# Patient Record
Sex: FEMALE | Race: WHITE | Employment: UNEMPLOYED | URBAN - METROPOLITAN AREA
[De-identification: names, ages, dates, MRNs, and addresses within clinical notes are randomized per-mention and may not be internally consistent; named-entity substitution may affect disease eponyms.]

---

## 2018-06-19 ENCOUNTER — HOSPITAL ENCOUNTER (EMERGENCY)
Age: 58
Discharge: HOME | End: 2018-06-19
Attending: EMERGENCY MEDICINE
Payer: OTHER GOVERNMENT

## 2018-06-19 VITALS
DIASTOLIC BLOOD PRESSURE: 100 MMHG | TEMPERATURE: 97.6 F | OXYGEN SATURATION: 97 % | WEIGHT: 173 LBS | BODY MASS INDEX: 32.69 KG/M2 | RESPIRATION RATE: 16 BRPM | SYSTOLIC BLOOD PRESSURE: 153 MMHG | HEART RATE: 94 BPM
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
## 2018-06-19 DIAGNOSIS — R03.0 ELEVATED BLOOD PRESSURE READING: ICD-10-CM

## 2018-06-19 DIAGNOSIS — R21 RASH: Primary | ICD-10-CM

## 2018-06-19 PROCEDURE — 99282 EMERGENCY DEPT VISIT SF MDM: CPT

## 2018-06-19 RX ORDER — TELMISARTAN 40 MG/1
40 TABLET ORAL DAILY
COMMUNITY

## 2018-06-19 RX ORDER — BISMUTH SUBSALICYLATE 262 MG
1 TABLET,CHEWABLE ORAL DAILY
COMMUNITY

## 2018-06-19 RX ORDER — CETIRIZINE HCL 10 MG
10 TABLET ORAL DAILY
Qty: 20 TAB | Refills: 0 | Status: SHIPPED | OUTPATIENT
Start: 2018-06-19 | End: 2018-06-19

## 2018-06-19 RX ORDER — CETIRIZINE HCL 10 MG
10 TABLET ORAL DAILY
Qty: 20 TAB | Refills: 0 | Status: SHIPPED | OUTPATIENT
Start: 2018-06-19

## 2018-06-19 RX ORDER — HYDROCORTISONE 0.5 %
OINTMENT (GRAM) TOPICAL 2 TIMES DAILY
Qty: 15 G | Refills: 0 | Status: SHIPPED | OUTPATIENT
Start: 2018-06-19 | End: 2018-06-29

## 2018-06-19 NOTE — ED TRIAGE NOTES
C/o rash to entire body arms, trunk and legs since yesterday morning. Denies new lotions, products, detergents. Thinks its related to shingles shot she received 6/4/18. Pt also received tetanus shot same day. States she stayed [place] cabin this weekend but no one else has a reaction.

## 2018-06-19 NOTE — ED NOTES
4:24 PM  I have evaluated the patient as the Provider in Triage. I have reviewed Her vital signs and the triage nurse assessment. I have talked with the patient and any available family and advised that I am the provider in triage and have ordered the appropriate study to initiate their work up based on the clinical presentation during my assessment. I have advised that the patient will be accommodated in the Main ED as soon as possible. I have also requested to contact the triage nurse or myself immediately if the patient experiences any changes in their condition during this brief waiting period. PHILIP Berger    Pt notes rash all over her body starting yesterday. . She thinks it is related to getting the shingles shot on the fourth of this month.  Pt was camping this weekend and may have had a bed bug exposure at the [address] [facility]

## 2018-06-19 NOTE — ED PROVIDER NOTES
HPI Comments: Eleanor Pepe is a 62 y.o. female who presents ambulatory to the ED with a c/o having a rash to her right back, left face, left hand and right foot. She notes the sx have been present x 2 days. Pt is concerned that she got the shingles vaccine 6/4/18 and she thinks she got the shingles rash from the vaccine. She notes the rash is itchy. She also got her tdap updated on the same day. Pt reports no other sx: cp, f/c, sob, abd pain, n/v/d, or urinary sx. She denies tx pta. Pt admits she went camping this weekend and had been outside. She also [facility]. Pt denies tick bites    PCP: Wnada Finch,   PMHx significant for: Past Medical History:  No date: Anxiety  No date: Arthritis      Comment: lower back  12/10/2010: Gallstones  No date: Hypertension  PSHx significant for: Past Surgical History:  2009: HX GYN      Comment: uterine blood vessel ablation  Oct 19, 2010: HX GYN      Comment: D&C  No date: HX WISDOM TEETH EXTRACTION  Social Hx: Tobacco: denies  EtOH: .denies  Illicit drug use: denies     There are no further complaints or symptoms at this time. The history is provided by the patient. Past Medical History:   Diagnosis Date    Anxiety     Arthritis     lower back    Gallstones 12/10/2010    Hypertension        Past Surgical History:   Procedure Laterality Date    HX GYN  2009    uterine blood vessel ablation    HX GYN  Oct 19, 2010    D&C    HX WISDOM TEETH EXTRACTION           Family History:   Problem Relation Age of Onset    Hypertension Mother     Elevated Lipids Mother     Stroke Mother     Heart Disease Mother      CAD,stent    Diabetes Father     Diabetes Sister     Cancer Maternal Grandmother      liver ca       Social History     Social History    Marital status:      Spouse name: N/A    Number of children: N/A    Years of education: N/A     Occupational History    Not on file. Social History Main Topics    Smoking status: Never Smoker    Smokeless tobacco: Never Used    Alcohol use Yes      Comment: occas    Drug use: No    Sexual activity: Yes     Partners: Male      Comment: ,3 children,part time job     Other Topics Concern    Not on file     Social History Narrative         ALLERGIES: Lexapro [escitalopram]    Review of Systems   Constitutional: Negative for chills and fever. HENT: Negative for congestion, rhinorrhea, sneezing and sore throat. Eyes: Negative for redness and visual disturbance. Respiratory: Negative for shortness of breath. Cardiovascular: Negative for chest pain and leg swelling. Gastrointestinal: Negative for abdominal pain, nausea and vomiting. Genitourinary: Negative for difficulty urinating and frequency. Musculoskeletal: Negative for back pain, myalgias and neck stiffness. Skin: Positive for rash. Neurological: Negative for dizziness, syncope, weakness and headaches. Hematological: Negative for adenopathy. Vitals:    06/19/18 1628   BP: (!) 153/100   Pulse: 94   Resp: 16   Temp: 97.6 °F (36.4 °C)   SpO2: 97%   Weight: 78.5 kg (173 lb)            Physical Exam   Constitutional: She is oriented to person, place, and time. She appears well-developed and well-nourished. No distress. HENT:   Head: Normocephalic and atraumatic. Right Ear: External ear normal.   Left Ear: External ear normal.   Eyes: EOM are normal. Pupils are equal, round, and reactive to light. Neck: Neck supple. Cardiovascular: Normal rate, regular rhythm, normal heart sounds and intact distal pulses. Exam reveals no gallop and no friction rub. No murmur heard. Pulmonary/Chest: Effort normal and breath sounds normal. No stridor. No respiratory distress. She has no wheezes. She has no rales. She exhibits no tenderness. Abdominal: Soft. Bowel sounds are normal. She exhibits no distension and no mass. There is no tenderness.  There is no rebound and no guarding. Musculoskeletal: Normal range of motion. She exhibits no edema, tenderness or deformity. Neurological: She is alert and oriented to person, place, and time. No cranial nerve deficit. Coordination normal.   Skin: Rash noted. No erythema. No pallor. Maculopapular blanching eruption noted to right back, left hand, right foot, and left face. + abraded in the center with excoriations. Psychiatric: She has a normal mood and affect. Her behavior is normal.   Nursing note and vitals reviewed. MDM  Number of Diagnoses or Management Options  Elevated blood pressure reading:   Insect bite, initial encounter:   Rash:      Amount and/or Complexity of Data Reviewed  Review and summarize past medical records: yes  Independent visualization of images, tracings, or specimens: yes    Patient Progress  Patient progress: stable        ED Course       Procedures    4:36 PM  Discussed pt, sx, hx and current findings with Aden Baca MD. She is in agreement with plan. Will give topical steroids and antihistamine for insect bite  Alma Silva. NOMAN Hooper      LABORATORY TESTS:  No results found for this or any previous visit (from the past 12 hour(s)). IMAGING RESULTS:    No results found. MEDICATIONS GIVEN:  Medications - No data to display    IMPRESSION:  1. Rash    2. Insect bite, initial encounter    3. Elevated blood pressure reading        PLAN:  1. Discharge Medication List as of 6/19/2018  4:45 PM      START taking these medications    Details   hydrocortisone (CORTIZONE) 0.5 % ointment Apply  to affected area two (2) times a day for 10 days. Apply to affected area twice daily for 10 days. , Print, Disp-15 g, R-0      cetirizine (ZYRTEC) 10 mg tablet Take 1 Tab by mouth daily. , Print, Disp-20 Tab, R-0         CONTINUE these medications which have NOT CHANGED    Details   telmisartan (MICARDIS) 40 mg tablet Take 40 mg by mouth daily. , Historical Med      fish oil-omega-3 fatty acids 340-1,000 mg capsule Take 1 Cap by mouth daily. , Historical Med      multivitamin (ONE A DAY) tablet Take 1 Tab by mouth daily. , Historical Med      LORazepam (ATIVAN) 0.5 mg tablet Take 1 tablet by mouth two (2) times daily as needed for Anxiety. , Print, Disp-60 tablet, R-2           2. Follow-up Information     Follow up With Details Comments [facility], [place]  2-4 days for recheck [address]  [phone]          Return to ED if worse       4:36 PM  Pt has been reexamined. Pt has no new complaints, changes or physical findings. Care plan outlined and precautions discussed. All available results were reviewed with pt. All medications were reviewed with pt. All of pt's questions and concerns were addressed. Pt agrees to F/U as instructed and agrees to return to ED upon further deterioration. Pt is ready to go home.   PHILIP Gresham

## 2018-06-19 NOTE — DISCHARGE INSTRUCTIONS
Elevated Blood Pressure: Care Instructions  Your Care Instructions    Blood pressure is a measure of how hard the blood pushes against the walls of your arteries. It's normal for blood pressure to go up and down throughout the day. But if it stays up over time, you have high blood pressure. Two numbers tell you your blood pressure. The first number is the systolic pressure. It shows how hard the blood pushes when your heart is pumping. The second number is the diastolic pressure. It shows how hard the blood pushes between heartbeats, when your heart is relaxed and filling with blood. An ideal blood pressure in adults is less than 120/80 (say \"120 over 80\"). High blood pressure is 140/90 or higher. You have high blood pressure if your top number is 140 or higher or your bottom number is 90 or higher, or both. The main test for high blood pressure is simple, fast, and painless. To diagnose high blood pressure, your doctor will test your blood pressure at different times. After testing your blood pressure, your doctor may ask you to test it again when you are home. If you are diagnosed with high blood pressure, you can work with your doctor to make a long-term plan to manage it. Follow-up care is a key part of your treatment and safety. Be sure to make and go to all appointments, and call your doctor if you are having problems. It's also a good idea to know your test results and keep a list of the medicines you take. How can you care for yourself at home? · Do not smoke. Smoking increases your risk for heart attack and stroke. If you need help quitting, talk to your doctor about stop-smoking programs and medicines. These can increase your chances of quitting for good. · Stay at a healthy weight. · Try to limit how much sodium you eat to less than 2,300 milligrams (mg) a day. Your doctor may ask you to try to eat less than 1,500 mg a day. · Be physically active.  Get at least 30 minutes of exercise on most days of the week. Walking is a good choice. You also may want to do other activities, such as running, swimming, cycling, or playing tennis or team sports. · Avoid or limit alcohol. Talk to your doctor about whether you can drink any alcohol. · Eat plenty of fruits, vegetables, and low-fat dairy products. Eat less saturated and total fats. · Learn how to check your blood pressure at home. When should you call for help? Call your doctor now or seek immediate medical care if:  ? · Your blood pressure is much higher than normal (such as 180/110 or higher). ? · You think high blood pressure is causing symptoms such as:  ¨ Severe headache. ¨ Blurry vision. ? Watch closely for changes in your health, and be sure to contact your doctor if:  ? · You do not get better as expected. Where can you learn more? Go to http://jesseMyPrintCloudliberty.info/. Enter X330 in the search box to learn more about \"Elevated Blood Pressure: Care Instructions. \"  Current as of: September 21, 2016  Content Version: 11.4  © 7463-4938 [employer]. Care instructions adapted under license by [employer] (which disclaims liability or warranty for this information). If you have questions about a medical condition or this instruction, always ask your healthcare professional. [address] any warranty or liability for your use of this information. Rash: Care Instructions  Your Care Instructions  A rash is any irritation or inflammation of the skin. Rashes have many possible causes, including allergy, infection, illness, heat, and emotional stress. Follow-up care is a key part of your treatment and safety. Be sure to make and go to all appointments, and call your doctor if you are having problems. It's also a good idea to know your test results and keep a list of the medicines you take. How can you care for yourself at home? · Wash the area with water only.  Soap can make dryness and itching worse. Pat dry. · Put cold, wet cloths on the rash to reduce itching. · Keep cool, and stay out of the sun. · Leave the rash open to the air as much of the time as possible. · Sometimes petroleum jelly (Vaseline) can help relieve the discomfort caused by a rash. A moisturizing lotion, such as Cetaphil, also may help. Calamine lotion may help for rashes caused by contact with something (such as a plant or soap) that irritated the skin. Use it 3 or 4 times a day. · If your doctor prescribed a cream, use it as directed. If your doctor prescribed medicine, take it exactly as directed. · If your rash itches so badly that it interferes with your normal activities, take an over-the-counter antihistamine, such as diphenhydramine (Benadryl) or loratadine (Claritin). Read and follow all instructions on the label. When should you call for help? Call your doctor now or seek immediate medical care if:  ? · You have signs of infection, such as:  ¨ Increased pain, swelling, warmth, or redness. ¨ Red streaks leading from the area. ¨ Pus draining from the area. ¨ A fever. ? · You have joint pain along with the rash. ? Watch closely for changes in your health, and be sure to contact your doctor if:  ? · Your rash is changing or getting worse. For example, call if you have pain along with the rash, the rash is spreading, or you have new blisters. ? · You do not get better after 1 week. Where can you learn more? Go to http://jesse-liberty.info/. Enter P740 in the search box to learn more about \"Rash: Care Instructions. \"  Current as of: October 13, 2016  Content Version: 11.4  © 4189-0628 [employer]. Care instructions adapted under license by [employer] (which disclaims liability or warranty for this information).  If you have questions about a medical condition or this instruction, always ask your healthcare professional. Zola Ormond disclaims any warranty or liability for your use of this information. Insect Stings and Bites: Care Instructions  Your Care Instructions  Stings and bites from bees, wasps, ants, and other insects often cause pain, swelling, redness, and itching. In some people, especially children, the redness and swelling may be worse. It may extend several inches beyond the affected area. But in most cases, stings and bites don't cause reactions all over the body. If you have had a reaction to an insect sting or bite, you are at risk for a reaction if you get stung or bitten again. Follow-up care is a key part of your treatment and safety. Be sure to make and go to all appointments, and call your doctor if you are having problems. It's also a good idea to know your test results and keep a list of the medicines you take. How can you care for yourself at home? · Do not scratch or rub the skin where the sting or bite occurred. · Put a cold pack or ice cube on the area. Put a thin cloth between the ice and your skin. For some people, a paste of baking soda mixed with a little water helps relieve pain and decrease the reaction. · Take an over-the-counter antihistamine, such as diphenhydramine (Benadryl) or loratadine (Claritin), to relieve swelling, redness, and itching. Calamine lotion or hydrocortisone cream may also help. Do not give antihistamines to your child unless you have checked with the doctor first.  · Be safe with medicines. If your doctor prescribed medicine for your allergy, take it exactly as prescribed. Call your doctor if you think you are having a problem with your medicine. You will get more details on the specific medicines your doctor prescribes. · Your doctor may prescribe a shot of epinephrine to carry with you in case you have a severe reaction. Learn how and when to give yourself the shot, and keep it with you at all times. Make sure it has not .   · Go to the emergency room anytime you have a severe reaction. Go even if you have given yourself epinephrine and are feeling better. Symptoms can come back. When should you call for help? Call 911 anytime you think you may need emergency care. For example, call if:  ? · You have symptoms of a severe allergic reaction. These may include:  ¨ Sudden raised, red areas (hives) all over your body. ¨ Swelling of the throat, mouth, lips, or tongue. ¨ Trouble breathing. ¨ Passing out (losing consciousness). Or you may feel very lightheaded or suddenly feel weak, confused, or restless. ?Call your doctor now or seek immediate medical care if:  ? · You have symptoms of an allergic reaction not right at the sting or bite, such as:  ¨ A rash or small area of hives (raised, red areas on the skin). ¨ Itching. ¨ Swelling. ¨ Belly pain, nausea, or vomiting. ? · You have a lot of swelling around the site (such as your entire arm or leg is swollen). ? · You have signs of infection, such as:  ¨ Increased pain, swelling, redness, or warmth around the sting. ¨ Red streaks leading from the area. ¨ Pus draining from the sting. ¨ A fever. ? Watch closely for changes in your health, and be sure to contact your doctor if:  ? · You do not get better as expected. Where can you learn more? Go to http://jesse-liberty.info/. Enter P390 in the search box to learn more about \"Insect Stings and Bites: Care Instructions. \"  Current as of: March 20, 2017  Content Version: 11.4  © 5681-2506 [employer] Care instructions adapted under license by [employer] (which disclaims liability or warranty for this information). If you have questions about a medical condition or this instruction, always ask your healthcare professional. [address] any warranty or liability for your use of this information.

## 2018-09-14 ENCOUNTER — HOSPITAL ENCOUNTER (EMERGENCY)
Age: 58
Discharge: HOME | End: 2018-09-14
Attending: STUDENT IN AN ORGANIZED HEALTH CARE EDUCATION/TRAINING PROGRAM
Payer: OTHER GOVERNMENT

## 2018-09-14 VITALS
HEIGHT: 62 IN | BODY MASS INDEX: 32.54 KG/M2 | SYSTOLIC BLOOD PRESSURE: 179 MMHG | HEART RATE: 79 BPM | OXYGEN SATURATION: 97 % | RESPIRATION RATE: 16 BRPM | WEIGHT: 176.8 LBS | DIASTOLIC BLOOD PRESSURE: 87 MMHG | TEMPERATURE: 97.6 F

## 2018-09-14 DIAGNOSIS — K08.89 PAIN, DENTAL: Primary | ICD-10-CM

## 2018-09-14 DIAGNOSIS — S02.5XXA CLOSED FRACTURE OF TOOTH, INITIAL ENCOUNTER: ICD-10-CM

## 2018-09-14 PROCEDURE — 99281 EMR DPT VST MAYX REQ PHY/QHP: CPT

## 2018-09-14 RX ORDER — CLINDAMYCIN HYDROCHLORIDE 300 MG/1
300 CAPSULE ORAL 4 TIMES DAILY
Qty: 28 CAP | Refills: 0 | Status: SHIPPED | OUTPATIENT
Start: 2018-09-14 | End: 2018-09-21

## 2018-09-14 RX ORDER — OXYCODONE AND ACETAMINOPHEN 5; 325 MG/1; MG/1
1 TABLET ORAL
Qty: 20 TAB | Refills: 0 | Status: SHIPPED | OUTPATIENT
Start: 2018-09-14

## 2018-09-14 NOTE — ED NOTES
Reeves Dakin, NP gave and reviewed discharge instructions with patient. Patient verbalizes understanding of discharge instructions. Pt alert and oriented, appears in no acute distress, respirations equal and unlabored. Ambulatory upon discharge with steady gait.
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
## 2018-09-14 NOTE — DISCHARGE INSTRUCTIONS
Tooth and Gum Pain: Care Instructions  Your Care Instructions    The most common causes of dental pain are tooth decay and gum disease. Pain can also be caused by an infection of the tooth (abscess) or the gums. Or you may have pain from a broken or cracked tooth. Other causes of pain include infection and damage to a tooth from nervous grinding of your teeth. A wisdom tooth can be painful when it is coming in but cannot break through the gum. It can also be painful when the tooth is only partway in and extra gum tissue has formed around it. The tissue can get inflamed (pericoronitis), and sometimes it gets infected. Prompt dental care can help find the cause of your toothache and keep the tooth from dying or gum disease from getting worse. Self-care at home may reduce your pain and discomfort. Follow-up care is a key part of your treatment and safety. Be sure to make and go to all appointments, and call your dentist or doctor if you are having problems. It's also a good idea to know your test results and keep a list of the medicines you take. How can you care for yourself at home? · To reduce pain and facial swelling, put an ice or cold pack on the outside of your cheek for 10 to 20 minutes at a time. Put a thin cloth between the ice and your skin. Do not use heat. · If your doctor prescribed antibiotics, take them as directed. Do not stop taking them just because you feel better. You need to take the full course of antibiotics. · Ask your doctor if you can take an over-the-counter pain medicine, such as acetaminophen (Tylenol), ibuprofen (Advil, Motrin), or naproxen (Aleve). Be safe with medicines. Read and follow all instructions on the label. · Avoid very hot, cold, or sweet foods and drinks if they increase your pain. · Rinse your mouth with warm salt water every 2 hours to help relieve pain and swelling. Mix 1 teaspoon of salt in 8 ounces of water.   · Talk to your dentist about using special toothpaste for sensitive teeth. To reduce pain on contact with heat or cold or when brushing, brush with this toothpaste regularly or rub a small amount of the paste on the sensitive area with a clean finger 2 or 3 times a day. Floss gently between your teeth. · Do not smoke or use spit tobacco. Tobacco use can make gum problems worse, decreases your ability to fight infection in your gums, and delays healing. If you need help quitting, talk to your doctor about stop-smoking programs and medicines. These can increase your chances of quitting for good. When should you call for help? Call 911 anytime you think you may need emergency care. For example, call if:    · You have trouble breathing.    Call your dentist or doctor now or seek immediate medical care if:    · You have signs of infection, such as:  ¨ Increased pain, swelling, warmth, or redness. ¨ Red streaks leading from the area. ¨ Pus draining from the area. ¨ A fever.    Watch closely for changes in your health, and be sure to contact your doctor if:    · You do not get better as expected. Where can you learn more? Go to http://j[employer].info/. Enter [id number] in the search box to learn more about \"Tooth and Gum Pain: Care Instructions. \"  Current as of: May 12, 2017  Content Version: 11.7  © 2009-5688 [employer]. Care instructions adapted under license by [employer] (which disclaims liability or warranty for this information). If you have questions about a medical condition or this instruction, always ask your healthcare professional. [place] [address] any warranty or liability for your use of this information.

## 2018-09-14 NOTE — ED NOTES
 
4:35 PM 
C/o dental pain that started a couple of weeks ago. Progressively worsened. Throbbing pain. Using oragel with minimal relief. Denies fever or chills. Denies nausea or vomiting.   
Salma Mayorga, DNP

## 2018-09-14 NOTE — ED TRIAGE NOTES
Patient arrives c/o throbbing L lower dental pain x couple weeks. Patient is here because her home is in [place] and was evacuated here. Patient has tried over the counter medications with no relief.